# Patient Record
Sex: FEMALE | Race: WHITE | Employment: OTHER | URBAN - METROPOLITAN AREA
[De-identification: names, ages, dates, MRNs, and addresses within clinical notes are randomized per-mention and may not be internally consistent; named-entity substitution may affect disease eponyms.]

---

## 2019-09-23 PROBLEM — I10 ESSENTIAL HYPERTENSION: Status: ACTIVE | Noted: 2019-09-23

## 2019-09-23 PROBLEM — R42 VERTIGO: Status: ACTIVE | Noted: 2019-07-19

## 2019-09-23 PROBLEM — R55 SYNCOPE AND COLLAPSE: Status: ACTIVE | Noted: 2019-07-19

## 2019-09-23 PROBLEM — H90.3 SENSORINEURAL HEARING LOSS, ASYMMETRICAL: Status: ACTIVE | Noted: 2018-11-29

## 2019-09-23 PROBLEM — S29.9XXA RIB INJURY: Status: ACTIVE | Noted: 2019-09-23

## 2019-09-23 PROBLEM — S39.012A LOW BACK STRAIN: Status: ACTIVE | Noted: 2019-09-23

## 2019-09-23 PROBLEM — S60.219A CONTUSION OF WRIST: Status: ACTIVE | Noted: 2019-09-23

## 2019-09-23 PROBLEM — M76.60 ACHILLES TENDINITIS: Status: ACTIVE | Noted: 2019-09-23

## 2021-06-24 PROBLEM — G90.A POTS (POSTURAL ORTHOSTATIC TACHYCARDIA SYNDROME): Status: ACTIVE | Noted: 2020-04-06

## 2021-06-24 PROBLEM — M35.00 SJOGREN'S SYNDROME (HCC): Status: ACTIVE | Noted: 2020-08-05

## 2021-06-24 PROBLEM — E27.1 ADRENAL INSUFFICIENCY (ADDISON'S DISEASE) (HCC): Status: ACTIVE | Noted: 2020-04-06

## 2021-11-11 ENCOUNTER — HOSPITAL ENCOUNTER (OUTPATIENT)
Dept: PHYSICAL THERAPY | Age: 86
Discharge: HOME | End: 2021-11-11
Payer: MEDICARE

## 2021-11-11 PROCEDURE — 97161 PT EVAL LOW COMPLEX 20 MIN: CPT

## 2021-11-11 PROCEDURE — 97110 THERAPEUTIC EXERCISES: CPT

## 2021-11-11 NOTE — PROGRESS NOTES
PHYSICAL THERAPY - DAILY TREATMENT NOTE    Patient Name: Vicente Gr        Date: 2021  : 1935   YES Patient  Verified  Visit #:     Insurance: Payor: Evelia Singh / Plan: [place] MEDICARE PART A & B / Product Type: Medicare /      In time: 245 Out time: 335   Total Treatment Time: 50     BCBS/Medicare Time Tracking (below)   Total Timed Codes (min):  NA 1:1 Treatment Time:  NA     TREATMENT AREA =  Left ankle pain [M25.572]    SUBJECTIVE  Pain Level (on 0 to 10 scale):  5-6  / 10   Medication Changes/New allergies or changes in medical history, any new surgeries or procedures? NO    If yes, update Summary List   Subjective Functional Status/Changes:  []  No changes reported     Patient reports previously having PT elsewhere and did not improve.            Modalities Rationale:    NA    min [] Estim, type/location:                                      []  att     []  unatt     []  w/US     []  w/ice    []  w/heat    min []  Mechanical Traction: type/lbs                   []  pro   []  sup   []  int   []  cont    []  before manual    []  after manual    min []  Ultrasound, settings/location:      min []  Iontophoresis w/ dexamethasone, location:                                               []  take home patch       []  in clinic    min []  Ice     []  Heat    location/position:     min []  Vasopneumatic Device, press/temp:    If using vaso (only need to measure limb vaso being performed on)      pre-treatment girth :       post-treatment girth :       measured at (landmark location) :      min []  Other:    [] Skin assessment post-treatment (if applicable):    []  intact    []  redness- no adverse reaction                  []redness  adverse reaction:        10 min Therapeutic Exercise:  [x]  See flow sheet   Rationale:      increase ROM, increase strength, improve coordination, improve balance and increase proprioception to improve the patients ability to perform pain free ADLs Billed With/As:   [] TE   [] TA   [] Neuro   [] Self Care Patient Education: [x] Review HEP    [] Progressed/Changed HEP based on:   [] positioning   [] body mechanics   [] transfers   [] heat/ice application    [] other:      Other Objective/Functional Measures:    See POC      Post Treatment Pain Level (on 0 to 10) scale:   5  / 10     ASSESSMENT  Assessment/Changes in Function:     See POC     []  See Progress Note/Recertification   Patient will continue to benefit from skilled PT services to modify and progress therapeutic interventions, address functional mobility deficits, address ROM deficits, address strength deficits, analyze and address soft tissue restrictions, analyze and cue movement patterns, analyze and modify body mechanics/ergonomics, assess and modify postural abnormalities, address imbalance/dizziness and instruct in home and community integration to attain remaining goals.    Progress toward goals / Updated goals:    See POC     PLAN  [x]  Upgrade activities as tolerated YES Continue plan of care   []  Discharge due to :    []  Other:      Therapist: Gwendolyn Morse    Date: 11/11/2021 Time: 4:41 PM     Future Appointments   Date Time Provider Yung Wells   11/18/2021  3:30 PM Lorene Serrato, PT [facility]   11/19/2021  3:00 PM Lorene Serrato, PT [facility]   11/23/2021 11:00 AM Lorene Serrato, PT [facility]   11/30/2021  2:00 PM Lorene Serrato, PT [facility]   12/2/2021  2:00 PM Lorene Serrato, PT [facility]   12/7/2021  2:00 PM Lorene Serrato, PT [facility]   12/9/2021  2:00 PM Lorene Serrato, PT [facility]   6/24/2022  1:40 PM [facility], NP [address] [facility]

## 2021-11-11 NOTE — PROGRESS NOTES
[facility] [address] [place] [address]  [address], [address], [address]  Phone: [phone]  Fax: [phone] / [facility]  Patient Name: Jarod Cabrera : 1935   Medical   Diagnosis: L ankle pain  Treatment Diagnosis: Left ankle pain [M25.572]   Onset Date: About 4 months ago     Referral Source: Sabrina Chavez MD [place] [facility]): 2021   Prior Hospitalization: See medical history Provider #: [id number]   Prior Level of Function: painfree ambulation, I with ADLs, recumbent biking 3-4x a week for 30 minutes   Comorbidities: POTS, seconary adrenal insufficiency, hearing impaired, arthritis, fibromyalgia, HTN, scoliosis, DDD   Medications: Verified on Patient Summary List   The Plan of Care and following information is based on the information from the initial evaluation.   ===========================================================================================  Assessment / key information:  Patient is an 80year old female presenting to [facility] with a dx of L ankle pain. Patient reports that she has had increased right and left ankle pain over the last 4-6 months which has progressively gotten worse. She has put herself into a walking boot which has helped with decreasing her overall pain. She is trying to slowly come out of her boot while wearing a supportive sneaker and custom made orthotics. Patient lives in a two story home with bedroom located on the first floor. She typically ambulates with a quadcane. Objective examination is as follows: 1) strength of the L ankle is limited into eversion 3-/5, DF 4-/5, PF 3+/5, posterior tib 4/5.  2) decreased gastroc muscle length bilaterally; lacks 2 degrees from neutral into dorsiflexion with knee extended.  3) decreased toe off and use of ankle rockers during gait 4) notable swelling throughout the L lateral ankle along peroneal mm group. Patient signs and sx are consistent with L ankle pain due to underlying mm weakness patient would benefit from skilled PT services in order to improve strength, range of motion balance, proprioception, coordination in order to improve ease with ADLs and functional mobility.   ===========================================================================================  Eval Complexity: History HIGH Complexity :3+ comorbidities / personal factors will impact the outcome/ POC ;  Examination  LOW Complexity : 1-2 Standardized tests and measures addressing body structure, function, activity limitation and / or participation in recreation ; Presentation LOW Complexity : Stable, uncomplicated ;  Decision Making MEDIUM Complexity : FOTO score of 26-74; Overall Complexity LOW   Problem List: pain affecting function, decrease ROM, decrease strength, edema affecting function, impaired gait/ balance, decrease ADL/ functional abilitiies, decrease activity tolerance, decrease flexibility/ joint mobility and decrease transfer abilities   Treatment Plan may include any combination of the following: Therapeutic exercise, Therapeutic activities, Neuromuscular re-education, Physical agent/modality, Gait/balance training, Manual therapy, Aquatic therapy, Patient education, Self Care training, Functional mobility training, Home safety training and Stair training  Patient / Family readiness to learn indicated by: asking questions, trying to perform skills and interest  Persons(s) to be included in education: patient (P)  Barriers to Learning/Limitations: None  Measures taken, if barriers to learning:    Patient Goal (s): 'able to do regular activities\"   Patient self reported health status: fair  Rehabilitation Potential: good   Short Term Goals: To be accomplished in  3  weeks:  1) Patient will be I and compliant with a basic HEP in order to maintain gains made inphysical therapy.   2) Patient will demonstrate >/- 2 degrees of DF with knee extended to allow for ease with ambulation. 3) Patient to fully wean out of CAM boot and into tennis shoe to improve ease with ADLs.  Long Term Goals: To be accomplished in  6  weeks:  1) Patient will be I and compliant with a progressive, high level HEP in order to maintain gains made in physical therapy. 2) Patient to ascend and descend 5 stairs with 1 HR with a reciprocal gait pattern to allow for ease with accessing her home. 3) Patient to report being able to perform recumbent biking for > 20 minutes to allow for ease with return to previous fitness program.  4) Patient to increase bilateral ankle strength to 4/5 to allow for ease with community ambulation. Frequency / Duration:   Patient to be seen  2  times per week for 4-6  weeks:  Patient / Caregiver education and instruction: self care, activity modification and exercises  Therapist Signature: Helder Zhang PT DPT  Date: 27/30/0724   Certification Period: 11/11/2021-2/11/2021 Time: 4:41 PM   ===========================================================================================  I certify that the above Physical Therapy Services are being furnished while the patient is under my care. I agree with the treatment plan and certify that this therapy is necessary. Physician Signature:                                                             Date:                                     Time:                                                                       Sandra Patton MD  Please sign and return to [facility] or you may fax the signed copy to [phone]. Thank you.

## 2021-11-18 ENCOUNTER — HOSPITAL ENCOUNTER (OUTPATIENT)
Dept: PHYSICAL THERAPY | Age: 86
Discharge: HOME | End: 2021-11-18
Payer: MEDICARE

## 2021-11-18 PROCEDURE — 97110 THERAPEUTIC EXERCISES: CPT

## 2021-11-18 PROCEDURE — 97112 NEUROMUSCULAR REEDUCATION: CPT

## 2021-11-18 NOTE — PROGRESS NOTES
PHYSICAL THERAPY - DAILY TREATMENT NOTE    Patient Name: Lo Delgado        Date: 2021  : 1935   YES Patient  Verified  Visit #:      of   12  Insurance: Payor: Darren Sherwood / Plan: [place] MEDICARE PART A & B / Product Type: Medicare /      In time: 330 Out time: 425   Total Treatment Time: 55     BCBS/Medicare Time Tracking (below)   Total Timed Codes (min):  45 1:1 Treatment Time:  45     TREATMENT AREA =  Left ankle pain [M25.572]    SUBJECTIVE  Pain Level (on 0 to 10 scale):  6  / 10   Medication Changes/New allergies or changes in medical history, any new surgeries or procedures? NO    If yes, update Summary List   Subjective Functional Status/Changes:  []  No changes reported     I have been trying not to wear the boot as much. I have most of my pain in the L foot but the R achilles is sometimes really strong. Modalities Rationale:     decrease edema, decrease inflammation and decrease pain to improve patient's ability to perform pain-free ADLs.     min [] Estim, type/location:                                      []  att     []  unatt     []  w/US     []  w/ice    []  w/heat    min []  Mechanical Traction: type/lbs                   []  pro   []  sup   []  int   []  cont    []  before manual    []  after manual    min []  Ultrasound, settings/location:      min []  Iontophoresis w/ dexamethasone, location:                                               []  take home patch       []  in clinic   10 min [x]  Ice     []  Heat    location/position: BL ankles longsit    min []  Vasopneumatic Device, press/temp:    If using vaso (only need to measure limb vaso being performed on)      pre-treatment girth :       post-treatment girth :       measured at (landmark location) :      min []  Other:    [x] Skin assessment post-treatment (if applicable):    [x]  intact    [x]  redness- no adverse reaction                  []redness  adverse reaction:        30 min Therapeutic Exercise:  [x] See flow sheet   Rationale:      increase ROM, increase strength, improve coordination and improve balance to improve the patients ability to perform unlimited ADLs. 15 min Neuromuscular Re-ed: [x]  See flow sheet   Rationale:    increase ROM, increase strength and improve coordination to improve the patients ability to improve stability with ambulation    Billed With/As:   [] TE   [] TA   [] Neuro   [] Self Care Patient Education: [x] Review HEP    [] Progressed/Changed HEP based on:   [] positioning   [] body mechanics   [] transfers   [] heat/ice application    [] other:      Other Objective/Functional Measures:    Initiate TE per FS     Post Treatment Pain Level (on 0 to 10) scale:   6 / 10     ASSESSMENT  Assessment/Changes in Function:     Difficulty with IN/EV control; manual cues required to prevent rotation of BL hips with Tband 4 way. Discussed slowly weaning out of CAM boot 1 hour/day per week in order to improve stability and muscle activation. []  See Progress Note/Recertification   Patient will continue to benefit from skilled PT services to modify and progress therapeutic interventions, address functional mobility deficits, address ROM deficits, address strength deficits, analyze and address soft tissue restrictions, analyze and cue movement patterns, analyze and modify body mechanics/ergonomics and assess and modify postural abnormalities to attain remaining goals. Progress toward goals / Updated goals:    Progressing towards STG 3.       PLAN  [x]  Upgrade activities as tolerated YES Continue plan of care   []  Discharge due to :    []  Other:      Therapist: Francisca Waters DPT    Date: 11/18/2021 Time: 3:36 PM     Future Appointments   Date Time Provider Yung Wells   11/19/2021  3:00 PM Filomena Suh PT [facility]   11/23/2021 11:00 AM Filomena Suh PT [facility]   11/30/2021  2:00 PM [facility]   12/2/2021  2:00 PM Filomena Suh, PT [facility] 12/7/2021  2:00 PM Heidi Carr, [facility]   12/9/2021  2:00 PM Heidi Carr PT [facility]   6/24/2022  1:40 PM Ann, Deepak Marina, NP [facility] [facility]

## 2021-11-19 ENCOUNTER — APPOINTMENT (OUTPATIENT)
Dept: PHYSICAL THERAPY | Age: 86
End: 2021-11-19
Payer: MEDICARE

## 2021-11-23 ENCOUNTER — HOSPITAL ENCOUNTER (OUTPATIENT)
Dept: PHYSICAL THERAPY | Age: 86
Discharge: HOME | End: 2021-11-23
Payer: MEDICARE

## 2021-11-23 PROCEDURE — 97140 MANUAL THERAPY 1/> REGIONS: CPT

## 2021-11-23 PROCEDURE — 97112 NEUROMUSCULAR REEDUCATION: CPT

## 2021-11-23 PROCEDURE — 97110 THERAPEUTIC EXERCISES: CPT

## 2021-11-23 NOTE — PROGRESS NOTES
PHYSICAL THERAPY - DAILY TREATMENT NOTE    Patient Name: Lo Delgado        Date: 2021  : 1935   YES Patient  Verified  Visit #:   3   of   12  Insurance: Payor: Darren Sherwood / Plan: VA MEDICARE PART A & B / Product Type: Medicare /      In time: 7241 Out time: 1200   Total Treatment Time: 55     BCBS/Medicare Time Tracking (below)   Total Timed Codes (min):  45 1:1 Treatment Time:  45     TREATMENT AREA =  Left ankle pain [M25.572]    SUBJECTIVE  Pain Level (on 0 to 10 scale):  3  / 10   Medication Changes/New allergies or changes in medical history, any new surgeries or procedures? NO    If yes, update Summary List   Subjective Functional Status/Changes:  []  No changes reported     I was in a lot of pain after last time. I rested and iced and it was gone by the next day but I think we did too much          Modalities Rationale:     decrease edema, decrease inflammation and decrease pain to improve patient's ability to perform pain-free ADLs.     min [] Estim, type/location:                                      []  att     []  unatt     []  w/US     []  w/ice    []  w/heat    min []  Mechanical Traction: type/lbs                   []  pro   []  sup   []  int   []  cont    []  before manual    []  after manual    min []  Ultrasound, settings/location:      min []  Iontophoresis w/ dexamethasone, location:                                               []  take home patch       []  in clinic   10 min [x]  Ice     []  Heat    location/position: BL ankles in longsitting    min []  Vasopneumatic Device, press/temp:    If using vaso (only need to measure limb vaso being performed on)      pre-treatment girth :       post-treatment girth :       measured at (landmark location) :      min []  Other:    [x] Skin assessment post-treatment (if applicable):    [x]  intact    [x]  redness- no adverse reaction                  []redness  adverse reaction:        20 min Therapeutic Exercise:  [x]  See flow sheet   Rationale:      increase ROM, increase strength and improve coordination to improve the patients ability to perform unlimited ADLs. 10 min Manual Therapy: STM/MFR peroneals, achilles and post tib with cross friction and TPR, gentle distraction to talocrural joint   Rationale:      decrease pain, increase ROM, increase tissue extensibility and decrease edema  to improve patient's ability to improve standing tolerance. The manual therapy interventions were performed at a separate and distinct time from the therapeutic activities interventions. 15 min Neuromuscular Re-ed: [x]  See flow sheet   Rationale:    increase ROM, increase strength, improve coordination and improve balance to improve the patients ability to improve muscle activation    Billed With/As:   [x] TE   [] TA   [] Neuro   [] Self Care Patient Education: [x] Review HEP    [] Progressed/Changed HEP based on:   [] positioning   [] body mechanics   [] transfers   [] heat/ice application    [] other:      Other Objective/Functional Measures:    Held standing exercises today- added seated HR and manual therapy    Significant restrictions and tenderness along peroneals and soleus musculature   Post Treatment Pain Level (on 0 to 10) scale:   2  / 10     ASSESSMENT  Assessment/Changes in Function:     Improved tolerance and and smooth movements with Tband 4 way, performed all with AROM prior to adding resistance. Encouraged patient to perform AROM of BL ankles prior to standing in the AM to prevent immediate onset of discomfort and pain with first few steps.       []  See Progress Note/Recertification   Patient will continue to benefit from skilled PT services to modify and progress therapeutic interventions, address functional mobility deficits, address ROM deficits, address strength deficits, analyze and address soft tissue restrictions, analyze and cue movement patterns, analyze and modify body mechanics/ergonomics and assess and modify postural abnormalities to attain remaining goals. Progress toward goals / Updated goals:    Progressing towards STG 2.       PLAN  [x]  Upgrade activities as tolerated YES Continue plan of care   []  Discharge due to :    []  Other:      Therapist: Abby Soulier, DPT    Date: 11/23/2021 Time: 1:44 PM     Future Appointments   Date Time Provider Yung Wells   11/30/2021  2:00 PM Margaret Huitron, PT [facility]   12/2/2021  2:00 PM Margaret Huitron, PT [facility]   12/7/2021  2:00 PM Margaret Huitron, PT [facility]   12/9/2021  2:00 PM Margaret Huitron, PT [facility]   6/24/2022  1:40 PM [facility], LIVIER Burris

## 2021-11-30 ENCOUNTER — APPOINTMENT (OUTPATIENT)
Dept: PHYSICAL THERAPY | Age: 86
End: 2021-11-30
Payer: MEDICARE

## 2021-12-02 ENCOUNTER — APPOINTMENT (OUTPATIENT)
Dept: PHYSICAL THERAPY | Age: 86
End: 2021-12-02

## 2021-12-07 ENCOUNTER — APPOINTMENT (OUTPATIENT)
Dept: PHYSICAL THERAPY | Age: 86
End: 2021-12-07

## 2021-12-09 ENCOUNTER — APPOINTMENT (OUTPATIENT)
Dept: PHYSICAL THERAPY | Age: 86
End: 2021-12-09

## 2021-12-29 NOTE — PROGRESS NOTES
[facility] PHYSICAL THERAPY AT [address] [place] [address]  [address], [address], [address]  Phone: [phone]  Fax: [phone] FOR PHYSICAL THERAPY          Patient Name: Drew Cuellar : 1935   Treatment/Medical Diagnosis: Left ankle pain [M25.572]   Onset Date: 4 months prior to eval    Referral Source: Michelle Roblero MD [facility]): 2021   Prior Hospitalization: See Medical History Provider #: [id number]   Prior Level of Function: painfree ambulation, I with ADLs, recumbent biking 3-4x a week for 30 minutes   Comorbidities: POTS, seconary adrenal insufficiency, hearing impaired, arthritis, fibromyalgia, HTN, scoliosis, DDD   Medications: Verified on Patient Summary List   Visits from [facility]: 2 Missed Visits: 3     Key Functional Changes/Progress: Mrs. Mildred Partida was seen for 2 follow up visits for BL ankle pain and was tolerating therex well with reports of moderate muscle soreness following sessions. Pt returned to MD where she was instructed to discontinue therapy as she was making good progress; she has not returned since last session on 2021 and has not verbalized need for further therapy. Assessments/Recommendations: Other: Discharge at physician request.     If you have any questions/comments please contact us directly at [phone]. Thank you for allowing us to assist in the care of your patient. Therapist Signature: Marianne Hernandez DPT Date: 2021   Reporting Period: 2021-2021 Time: 1:79 AM     I certify that the above Physical Therapy Services are being furnished while the patient is under my care. I agree with the treatment plan and certify that this therapy is necessary.     Physician Signature:                                                             Date:                                     Time:                                                                       Michelle Roblero MD

## 2022-03-09 ENCOUNTER — HOSPITAL ENCOUNTER (OUTPATIENT)
Dept: PHYSICAL THERAPY | Age: 87
Discharge: HOME | End: 2022-03-09
Payer: MEDICARE

## 2022-03-09 PROCEDURE — 97535 SELF CARE MNGMENT TRAINING: CPT

## 2022-03-09 PROCEDURE — 97112 NEUROMUSCULAR REEDUCATION: CPT

## 2022-03-09 PROCEDURE — 97162 PT EVAL MOD COMPLEX 30 MIN: CPT

## 2022-03-09 NOTE — PROGRESS NOTES
PHYSICAL THERAPY - DAILY TREATMENT NOTE     Patient Name: Gunner Francis        Date: 3/9/2022  : 1935   YES Patient  Verified  Visit #:      of   16  Insurance: Payor: VA MEDICARE / Plan: VA MEDICARE PART A & B / Product Type: Medicare /      In time: 1485 Out time: 125   Total Treatment Time: 70     Medicare/BCBS Time Tracking (below)   Total Timed Codes (min):  30 1:1 Treatment Time:  70     TREATMENT AREA =  Other abnormalities of gait and mobility [R26.89]    SUBJECTIVE    Pain Level (on 0 to 10 scale):  2  / 10   Medication Changes/New allergies or changes in medical history, any new surgeries or procedures? NO    If yes, update Summary List   Subjective Functional Status/Changes:  []  No changes reported       See POC         OBJECTIVE    10 min Neuromuscular Re-ed: [x]  See flow sheet   Rationale:      increase ROM and improve coordination to improve the patients ability to ambulate safely with head and eye movement and reduce fall risk     20 min Self Care: Instruction in safety w/ bed mobility.   Importance of movement to re establish balance and decrease fall risk, education in importance of reducing fear avoidance activities and vestibular rehab protocols   Rationale:    decrease fear of movement to improve the patients ability to ambulate safely and perform ADL's    Billed With/As:   [] TE   [] TA   [x] Neuro   [x] Self Care Patient Education: [x] Review HEP    [] Progressed/Changed HEP based on:   [] positioning   [] body mechanics   [] transfers   [] heat/ice application    [] other:        Other Objective/Functional Measures:    See POC  See TE Flow sheet     Post Treatment Pain Level (on 0 to 10) scale:   2  / 10     ASSESSMENT    Assessment/Changes in Function:     See POC     []  See Progress Note/Recertification   Patient will continue to benefit from skilled PT services to modify and progress therapeutic interventions, address functional mobility deficits, address ROM deficits, address strength deficits, analyze and address soft tissue restrictions, analyze and cue movement patterns, analyze and modify body mechanics/ergonomics, assess and modify postural abnormalities and address imbalance/dizziness to attain remaining goals.       Progress toward goals / Updated goals:    See POC     PLAN    [x]  Upgrade activities as tolerated YES Continue plan of care   []  Discharge due to :    []  Other:      Therapist: Patrick Vega PT    Date: 3/9/2022 Time: 8:17 AM     Future Appointments   Date Time Provider Yung Wells   3/9/2022 12:15 PM Maria Elena Hardy, PT [facility]   6/24/2022  1:40 PM [facility], NP [address]

## 2022-03-09 NOTE — PROGRESS NOTES
[facility] PHYSICAL THERAPY AT [facility] [address], [address] [facility], [facility] [facility] [facility] [address] - Phone: [phone]  Fax: [phone] / [facility]  Patient Name: Crow Escalante : 1935   Medical   Diagnosis: Other abnormalities of gait and mobility [R26.89] Treatment Diagnosis: Vestibular/balance   Onset Date: Ongoing     Referral Source: Iqra Bhandari* Start of Care [facility]): 3/9/2022   Prior Hospitalization: See medical history Provider #: [id number]   Prior Level of Function: Ambulates w/ quad cane   Comorbidities: POTS, Fibromyalgia, Arthritis, Thyroid, HTN, Hearing impaired, Scoliosis, Vestibular deteriation   Medications: Verified on Patient Summary List     The Plan of Care and following information is based on the information from the initial evaluation.     ==================================================================================  Assessment / key information:   Crow Escalante is a 80 y.o.  yo female with Dx of Other abnormalities of gait and mobility [R26.89]. Patient reports peripheral neuropathy for appx 10 years and worsening vestibular issues over the past 2-2.5 years. Patient ambulates into clinic with small based quad cane; states she does not use it at home, only for community ambulation. Lives in a two story home with her  and reports h/o BPPV. Last vertigo attack was 2 years ago and she reports she is very fearful of another attack so she limits her head movement and rolling . Vertigo  [x] Imbalance   [x]   Dizziness  [x]   Spontaneous [x]    Worse with: Lying down  Sitting up in bed  Turning head Side to side    Standing quickly Bending Forward Looking up    How long did episode last? Sec: Seconds. Past events lasted half a day   Additional symptoms?    Nausea   [x]       Loss of Balance [x]       Headache [x]         Sensory disturbances  Falls [x]  2019     Symptom relief? In past used Eply maneuver. Currently unsure of symptom relief. Does sneezing, coughing, holding your breath or specific sounds exacerbate dizziness? Increased sounds makes her feel 'off balance'  Light sensitivity [x]      Prior treatment: Balance therapy 2 years ago    Hearing Loss L>R  Neck Pain [x]  Back Pain [x] herniated discs/arthritis  Tinnitus L>R  Gradual onset of hearing loss:  Imaging/Testing: Patient has undergone Balance Master Testing, will bring in report    Posture: FHRS. Ambulates with limited to no reciprocal arm swing and high guard position when ambulating without cane; small shuffling step. Sensation/Proprioception: grossly intact  Cervical Screen:                 VAST: NEGATIVE     C/S ROM Limited by 25% in rotation and SB     Cervical Dizziness NEGATIVE     Joint position sense: grossly intact    VOR:    GAZE Nystagmus Direction   Center N    Right Y: mild Right   Left N    Upward N      Head Thrust: WNL  Dynamic Visual Acuity: WNL no Oscillopsia    Key   N = Normal S = Sway  F = Fall  R = Right  L = Left         _N__          __F             __S__           __F__           __S__            __F__          __S__     FGA: 9/30  DHI: 50 Moderate Perception of Handicap  Roll Test: WNL However patient has a heightened fear response to all bed mobility secondary to prior episodes of BPPV  MMT: reveals grossly 4+/5 throughout bilateral LE with the exception of hip rotators 3+/5, hip abd/ext 4-/5    Objectively, the patient demonstrates decreased static/dynamic functional balance, diminished ambulatory balance (FGA = 9/30 points) and dizziness with ADLs ([facility] [address] [place] [address] = 50 points). LUPIS test was abnormal with deficits indicating Abnormal CNS multifactorial issues. .Pt will benefit from PT/Vestibular rehab to address these deficits, improve functional independence and reduce dizziness and imbalance with normal daily activities.  Thank you for this referral. ===========================================================================================  Eval Complexity: History HIGH Complexity :3+ comorbidities / personal factors will impact the outcome/ POC ;  Examination  HIGH Complexity : 4+ Standardized tests and measures addressing body structure, function, activity limitation and / or participation in recreation ; Presentation MEDIUM Complexity : Evolving with changing characteristics ; Decision Making MEDIUM Complexity : FOTO score of 26-74; Overall Complexity MEDIUM  Problem List: impaired gait/ balance, decrease ADL/ functional abilitiies, decrease activity tolerance and decrease transfer abilities   Treatment Plan may include any combination of the following: Therapeutic exercise, Therapeutic activities, Neuromuscular re-education, Gait/balance training and Patient education  Patient / Family readiness to learn indicated by: asking questions, trying to perform skills and interest  Persons(s) to be included in education: patient (P)  Barriers to Learning/Limitations: yes;  sensory deficits-vision/hearing/speech  Measures taken, if barriers to learning: Written instructions and verbal understanding from patient   Patient Goal (s): \"Strengthen legs and better balance on uneven surfaces\"     Rehabilitation Potential: excellent   Short Term Goals: To be accomplished in 4-6  treatments:  1. Patient will report at least 25% reduction of symptoms with ADLs. 2. Patient will be independent and complaint with HEP TID to reduce imbalance and dizziness with ADLs. 3. DHI will improve to less than or equal to 45 points to demonstrate reduction of dizziness and imbalance with ADLs. 4. Patient will demonstrate MSR EO/EC for 30 seconds indicating improved proprioception and balance required for safe ambulation and decreasing fall risk   Long Term Goals: To be accomplished in 10-12 treatments:  1. Patient will report at least 50% reduction of symptoms with ADLs.   2. Patient will be independent with self progression of HEP and demonstrate willingness to continue HEP after D/C to maximize/maintain gains in functional mobility. 3. DHI will improve to less than or equal to 39 points to demonstrate significant reduction of dizziness and imbalance with ADLs. 4. FGA will improve to greater then or equal to 20/30 points to demonstrate a significant improvement in ambulatory balance. Frequency / Duration:   Patient to be seen  2  times per week for 8  weeks:  Patient / Caregiver education and instruction: self care, activity modification and exercises    Therapist Signature: Alexei Jimenes PT Date: 2/1/1490   Certification Period: 3/9/22 to 6/9/22 Time: 8:17 AM     ===========================================================================================  I certify that the above Physical Therapy Services are being furnished while the patient is under my care. I agree with the treatment plan and certify that this therapy is necessary. Physician Signature:        Date:       Time:        Neftaly Infante*  Please sign and return to [facility] or you may fax the signed copy to [phone].   Thank

## 2022-03-15 ENCOUNTER — HOSPITAL ENCOUNTER (OUTPATIENT)
Dept: PHYSICAL THERAPY | Age: 87
Discharge: HOME | End: 2022-03-15
Payer: MEDICARE

## 2022-03-15 PROCEDURE — 97112 NEUROMUSCULAR REEDUCATION: CPT

## 2022-03-15 PROCEDURE — 97110 THERAPEUTIC EXERCISES: CPT

## 2022-03-15 NOTE — PROGRESS NOTES
PHYSICAL THERAPY - DAILY TREATMENT NOTE    Patient Name: Memo Boles        Date: 3/15/2022  : 1935   YES Patient  Verified  Visit #:   2   of   12  Insurance: Payor: Marlyn Counter / Plan: [place] MEDICARE PART A & B / Product Type: Medicare /      In time: 300 Out time: 345   Total Treatment Time: 45     BCBS/Medicare Time Tracking (below)   Total Timed Codes (min):  45 1:1 Treatment Time:  45     TREATMENT AREA =  Other abnormalities of gait and mobility [R26.89]    SUBJECTIVE  Pain Level (on 0 to 10 scale):  0  / 10   Medication Changes/New allergies or changes in medical history, any new surgeries or procedures? NO    If yes, update Summary List   Subjective Functional Status/Changes:  []  No changes reported     Patient reports that she tried to lay on her sides while sleeping but her back really was sore.             Modalities Rationale:   min [] Estim, type/location:                                      []  att     []  unatt     []  w/US     []  w/ice    []  w/heat    min []  Mechanical Traction: type/lbs                   []  pro   []  sup   []  int   []  cont    []  before manual    []  after manual    min []  Ultrasound, settings/location:      min []  Iontophoresis w/ dexamethasone, location:                                               []  take home patch       []  in clinic    min []  Ice     []  Heat    location/position:     min []  Vasopneumatic Device, press/temp:    If using vaso (only need to measure limb vaso being performed on)      pre-treatment girth :       post-treatment girth :       measured at (landmark location) :      min []  Other:    [] Skin assessment post-treatment (if applicable):    []  intact    []  redness- no adverse reaction                  []redness  adverse reaction:        15 min Therapeutic Exercise:  [x]  See flow sheet   Rationale:      increase ROM, increase strength, improve coordination, improve balance and increase proprioception to improve the patients ability to perform unlimited ADLs        30 min Neuromuscular Re-ed: [x]  See flow sheet   Rationale:    increase ROM, increase strength, improve coordination, improve balance and increase proprioception to improve the patients ability to ease with ADLs with decreased risk of falls and increased vestibular compensation    Billed With/As:   [] TE   [] TA   [] Neuro   [] Self Care Patient Education: [x] Review HEP    [] Progressed/Changed HEP based on:   [] positioning   [] body mechanics   [] transfers   [] heat/ice application    [] other:      Other Objective/Functional Measures:    Initiated exercises per flow sheet      Post Treatment Pain Level (on 0 to 10) scale:   0  / 10     ASSESSMENT  Assessment/Changes in Function:     Patient had notable difficulty with all balance exercises today with decreased stability as hold time increased. She tolerated all new vestibular exercises well without increase in overall symptoms. She brought in her VNG testing from 2019 which indicated bilateral vestibular hypofunction L>R. []  See Progress Note/Recertification   Patient will continue to benefit from skilled PT services to modify and progress therapeutic interventions, address functional mobility deficits, address ROM deficits, address strength deficits, analyze and address soft tissue restrictions, analyze and cue movement patterns, analyze and modify body mechanics/ergonomics, assess and modify postural abnormalities, address imbalance/dizziness and instruct in home and community integration to attain remaining goals.    Progress toward goals / Updated goals:    Progressing towards LTG 2      PLAN  [x]  Upgrade activities as tolerated YES Continue plan of care   []  Discharge due to :    []  Other:      Therapist: Jonatan Ceja    Date: 3/15/2022 Time: 4:11 PM     Future Appointments   Date Time Provider Yung Wells   3/21/2022  1:15 PM Angelina Prince, PT [facility]   3/25/2022 12:15 PM Priscila Maldonado, PT [facility]   3/29/2022  3:00 PM Horald Fang [facility]   3/31/2022 10:45 AM Priscila Maldonado, PT [facility]   4/5/2022 12:45 PM Horald Fang [facility]   4/7/2022  1:30 PM Horald Fang [facility]   4/12/2022  1:30 PM Horald Fang [facility]   4/14/2022  1:30 PM Horald Fang [facility]   4/20/2022 12:15 PM Priscila Maldonado PT [facility]   4/21/2022  1:30 PM Horald Fang [facility]   4/26/2022  1:30 PM Horald Fang [facility]   4/28/2022  1:15 PM Stephen Leal PT [facility]   6/24/2022  1:40 PM Ann, [place] [address] [facility]

## 2022-03-21 ENCOUNTER — HOSPITAL ENCOUNTER (OUTPATIENT)
Dept: PHYSICAL THERAPY | Age: 87
Discharge: HOME | End: 2022-03-21
Payer: MEDICARE

## 2022-03-21 PROCEDURE — 97112 NEUROMUSCULAR REEDUCATION: CPT

## 2022-03-21 PROCEDURE — 97110 THERAPEUTIC EXERCISES: CPT

## 2022-03-21 PROCEDURE — 97116 GAIT TRAINING THERAPY: CPT

## 2022-03-21 NOTE — PROGRESS NOTES
PHYSICAL THERAPY - DAILY TREATMENT NOTE    Patient Name: Taylor Angelo        Date: 3/21/2022  : 1935   YES Patient  Verified  Visit #:   3   of   12  Insurance: Payor: Renaldo Black / Plan: [place] MEDICARE PART A & B / Product Type: Medicare /      In time: 1:30 P Out time: 2:10 P   Total Treatment Time: 40     BCBS/Medicare Time Tracking (below)   Total Timed Codes (min):  40 1:1 Treatment Time:  40     TREATMENT AREA =  Other abnormalities of gait and mobility [R26.89]    SUBJECTIVE  Pain Level (on 0 to 10 scale):  0  / 10   Medication Changes/New allergies or changes in medical history, any new surgeries or procedures? NO    If yes, update Summary List   Subjective Functional Status/Changes:  []  No changes reported     Patient reports she has ringing in her L ear today & has little more lightheadedness than usual today, she is doing the eye movement exercises bid.            Modalities Rationale:    Patient deferred   min [] Estim, type/location:                                      []  att     []  unatt     []  w/US     []  w/ice    []  w/heat    min []  Mechanical Traction: type/lbs                   []  pro   []  sup   []  int   []  cont    []  before manual    []  after manual    min []  Ultrasound, settings/location:      min []  Iontophoresis w/ dexamethasone, location:                                               []  take home patch       []  in clinic    min []  Ice     []  Heat    location/position:     min []  Vasopneumatic Device, press/temp:    If using vaso (only need to measure limb vaso being performed on)      pre-treatment girth :       post-treatment girth :       measured at (landmark location) :      min []  Other:    [] Skin assessment post-treatment (if applicable):    []  intact    []  redness- no adverse reaction                  []redness  adverse reaction:        15 min Therapeutic Exercise:  [x]  See flow sheet   Rationale:      increase ROM and increase strength to improve the patients ability to return to safe sit to stand transfers     15 min Neuromuscular Re-ed: [x]  See flow sheet   Rationale:    improve coordination, improve balance and increase proprioception to improve the patients ability to return to symptom free ADLs    10 min Gait Training:  _see flow sheet__ feet with _SBQC on R__ device on level surfaces with _SBA/CGA__ level of assistance   Rationale: To improve ambulation safety and efficiency in order to improve patient's ability to safely ambulate at home for self care. Billed With/As:   [] TE   [] TA   [] Neuro   [] Self Care Patient Education: [x] Review HEP    [] Progressed/Changed HEP based on:   [] positioning   [] body mechanics   [] transfers   [] heat/ice application    [] other:      Other Objective/Functional Measures:    Progressed balance exercises & gait training per flow sheet, added toe taps without UE assist with 6 inch step    Eye movement exercises, progressed depth of exercises in all directions (and reviewed current HEP)     Post Treatment Pain Level (on 0 to 10) scale:   0  / 10     ASSESSMENT  Assessment/Changes in Function:     Good tolerance to all progressions today, no change in symptoms, improved use of ankle/hip balance strategies, difficulty with SLS & weight shifting without support      []  See Progress Note/Recertification   Patient will continue to benefit from skilled PT services to modify and progress therapeutic interventions, address functional mobility deficits, address ROM deficits, address strength deficits, analyze and address soft tissue restrictions, analyze and cue movement patterns, analyze and modify body mechanics/ergonomics, assess and modify postural abnormalities, address imbalance/dizziness and instruct in home and community integration to attain remaining goals.    Progress toward goals / Updated goals:    Progressing towards STG 1      PLAN  []  Upgrade activities as tolerated YES Continue plan of care []  Discharge due to :    []  Other:      Therapist: Too Pedro PT    Date: 3/21/2022 Time: 1:29 PM     Future Appointments   Date Time Provider Yung Wells   3/25/2022 12:15 PM Abhay Garcia, PT [facility]   3/29/2022  3:00 PM Sedrick Crow [facility]   3/31/2022 10:45 AM Abhay Garcia, PT [facility]   4/5/2022 12:45 PM Sedrick Crow [facility]   4/7/2022  1:30 PM Sedrick Crow [facility]   4/12/2022  1:30 PM Sedrick Crow [facility]   4/14/2022  1:30 PM Sedrick Crow [facility]   4/20/2022 12:15 PM Abhay Garcia, PT [facility]   4/21/2022  1:30 PM Sedrick Crow [facility]   4/26/2022  1:30 PM Sedrick Crow [facility]   4/28/2022  1:15 PM Kannan Leong, PT [facility]   6/24/2022  1:40 PM [facility], [place] [facility]

## 2022-03-25 ENCOUNTER — HOSPITAL ENCOUNTER (OUTPATIENT)
Dept: PHYSICAL THERAPY | Age: 87
Discharge: HOME | End: 2022-03-25
Payer: MEDICARE

## 2022-03-25 PROCEDURE — 97112 NEUROMUSCULAR REEDUCATION: CPT

## 2022-03-25 PROCEDURE — 97530 THERAPEUTIC ACTIVITIES: CPT

## 2022-03-25 PROCEDURE — 97110 THERAPEUTIC EXERCISES: CPT

## 2022-03-29 ENCOUNTER — HOSPITAL ENCOUNTER (OUTPATIENT)
Dept: PHYSICAL THERAPY | Age: 87
Discharge: HOME | End: 2022-03-29
Payer: MEDICARE

## 2022-03-29 PROCEDURE — 97116 GAIT TRAINING THERAPY: CPT

## 2022-03-29 PROCEDURE — 97112 NEUROMUSCULAR REEDUCATION: CPT

## 2022-03-29 PROCEDURE — 97110 THERAPEUTIC EXERCISES: CPT

## 2022-03-29 NOTE — PROGRESS NOTES
PHYSICAL THERAPY - DAILY TREATMENT NOTE    Patient Name: Matteo Carlton        Date: 3/29/2022  : 1935   YES Patient  Verified  Visit #:      of   12  Insurance: Payor: Sae Case / Plan: [place] MEDICARE PART A & B / Product Type: Medicare /      In time: 300 Out time: 345   Total Treatment Time: 45     BCBS/Medicare Time Tracking (below)   Total Timed Codes (min):  45 1:1 Treatment Time:  45     TREATMENT AREA =  Other abnormalities of gait and mobility [R26.89]    SUBJECTIVE  Pain Level (on 0 to 10 scale):  0  / 10   Medication Changes/New allergies or changes in medical history, any new surgeries or procedures? NO    If yes, update Summary List   Subjective Functional Status/Changes:  []  No changes reported     Patient reports that shes having a POTS day and is feeling not so great today.            Modalities Rationale:     min [] Estim, type/location:                                      []  att     []  unatt     []  w/US     []  w/ice    []  w/heat    min []  Mechanical Traction: type/lbs                   []  pro   []  sup   []  int   []  cont    []  before manual    []  after manual    min []  Ultrasound, settings/location:      min []  Iontophoresis w/ dexamethasone, location:                                               []  take home patch       []  in clinic    min []  Ice     []  Heat    location/position:     min []  Vasopneumatic Device, press/temp:    If using vaso (only need to measure limb vaso being performed on)      pre-treatment girth :       post-treatment girth :       measured at (landmark location) :      min []  Other:    [] Skin assessment post-treatment (if applicable):    []  intact    []  redness- no adverse reaction                  []redness - adverse reaction:        15 min Therapeutic Exercise:  [x]  See flow sheet   Rationale:      increase ROM, increase strength, improve coordination, improve balance and increase proprioception to improve the patients ability to perform unlimited ADLs      20 min Neuromuscular Re-ed: [x]  See flow sheet   Rationale:    increase ROM, increase strength, improve coordination, improve balance and increase proprioception to improve the patients ability to perform ADLs with decreased risk of falls. 10 min Gait Training:  See flow sheet for gait exercises requiring CGA to min A as needed. Rationale: To improve ambulation safety and efficiency in order to improve patient's ability to safely ambulate at home for self care. Billed With/As:   [] TE   [] TA   [] Neuro   [] Self Care Patient Education: [x] Review HEP    [] Progressed/Changed HEP based on:   [] positioning   [] body mechanics   [] transfers   [] heat/ice application    [] other:      Other Objective/Functional Measures:    Inc reps of balance exercises today      Post Treatment Pain Level (on 0 to 10) scale:   0  / 10     ASSESSMENT  Assessment/Changes in Function:     Patient had difficulty with single limbb exercises today requiring MIN A to maintain upright. She is tolerating all balance progressions well without increase in symptoms. []  See Progress Note/Recertification   Patient will continue to benefit from skilled PT services to modify and progress therapeutic interventions, address functional mobility deficits, address ROM deficits, address strength deficits, analyze and address soft tissue restrictions, analyze and cue movement patterns, analyze and modify body mechanics/ergonomics, assess and modify postural abnormalities, address imbalance/dizziness and instruct in home and community integration to attain remaining goals. Progress toward goals / Updated goals:    Progressing towards LTG 2.       PLAN  [x]  Upgrade activities as tolerated YES Continue plan of care   []  Discharge due to :    []  Other:      Therapist: Ruben Thornton    Date: 3/29/2022 Time: 3:58 PM     Future Appointments   Date Time Provider Yung Wells   4/5/2022 12:45 PM Luh Pinedar [facility]   4/7/2022  1:30 PM Luh Leer [facility]   4/12/2022  1:30 PM Luh Leer [facility]   4/14/2022  1:30 PM Luh Leer [facility]   4/20/2022 12:15 PM Juanpablo Stinson, PT [facility]   4/21/2022  1:30 PM Luh iPnedar [facility]   4/26/2022  1:30 PM Luh Pinedar [facility]   4/28/2022  1:15 PM Jose Alberto Hager, PT [facility]   6/17/2022  3:40 PM [place], [place] [address] [facility]

## 2022-03-31 ENCOUNTER — APPOINTMENT (OUTPATIENT)
Dept: PHYSICAL THERAPY | Age: 87
End: 2022-03-31
Payer: MEDICARE

## 2022-04-05 ENCOUNTER — APPOINTMENT (OUTPATIENT)
Dept: PHYSICAL THERAPY | Age: 87
End: 2022-04-05

## 2022-04-07 ENCOUNTER — APPOINTMENT (OUTPATIENT)
Dept: PHYSICAL THERAPY | Age: 87
End: 2022-04-07

## 2022-04-12 ENCOUNTER — APPOINTMENT (OUTPATIENT)
Dept: PHYSICAL THERAPY | Age: 87
End: 2022-04-12

## 2022-04-14 ENCOUNTER — APPOINTMENT (OUTPATIENT)
Dept: PHYSICAL THERAPY | Age: 87
End: 2022-04-14

## 2022-04-20 ENCOUNTER — APPOINTMENT (OUTPATIENT)
Dept: PHYSICAL THERAPY | Age: 87
End: 2022-04-20

## 2022-04-21 ENCOUNTER — APPOINTMENT (OUTPATIENT)
Dept: PHYSICAL THERAPY | Age: 87
End: 2022-04-21

## 2022-04-26 ENCOUNTER — APPOINTMENT (OUTPATIENT)
Dept: PHYSICAL THERAPY | Age: 87
End: 2022-04-26

## 2022-04-28 ENCOUNTER — APPOINTMENT (OUTPATIENT)
Dept: PHYSICAL THERAPY | Age: 87
End: 2022-04-28

## 2022-05-25 NOTE — PROGRESS NOTES
[address] [place] [address] PHYSICAL THERAPY AT [address] [place] [address] [facility], [address] [place] [address], [address] [facility] [address]  Phone: [phone]  Fax: [phone] FOR PHYSICAL THERAPY          Patient Name: Ronald Sandoval : 1935   Treatment/Medical Diagnosis: Other abnormalities of gait and mobility [R26.89]   Onset Date: ongion    Referral Source: Sharonda Boykin* Start of Care [facility]): 3/9/2022   Prior Hospitalization: See Medical History Provider #: [id number]   Prior Level of Function: Ambulation with quad cane   Comorbidities: POTS, fibromyalgia, arthritis, thyroid, HTN, hearing impairment, scoliosis, vestibular deterioration    Medications: Verified on Patient Summary List   Visits from [facility]: 5 Missed Visits: -       Key Functional Changes/Progress: Ms. Gia Mckeon was last seen on 3/29/2022 and at this point in time was having increased POTS symptoms but was tolerating all vestibular, strengthening, gait and balance exercises well with minimal increase in symptoms. Upon f/u phone call with clinic she cancelled remaining visits due to having \"heart issues\"  Her current status is unknown and formal assessment towards goals is unable to be made at this point in time. Assessments/Recommendations: Discontinue therapy due to lack of attendance or compliance. If you have any questions/comments please contact us directly at [phone]. Thank you for allowing us to assist in the care of your patient.     Therapist Signature: Sade Elias PT DPT  Date: 2022   Reporting Period: 3/9/2022- 3/29/2022 Time: 8:38 AM

## 2022-09-09 NOTE — PROGRESS NOTES
PHYSICAL THERAPY - DAILY TREATMENT NOTE     Patient Name: Benny Richards        Date: 3/25/2022  : 1935   YES Patient  Verified  Visit #:      12  Insurance: Payor: [employer] / Plan: [place] A & B / Product Type: Medicare /      In time:  Out time: 105   Total Treatment Time: 50     Medicare/BCBS Time Tracking (below)   Total Timed Codes (min):  50 1:1 Treatment Time:  50     TREATMENT AREA =  Other abnormalities of gait and mobility [R26.89]    SUBJECTIVE    Pain Level (on 0 to 10 scale):  0  / 10   Medication Changes/New allergies or changes in medical history, any new surgeries or procedures?     NO    If yes, update Summary List   Subjective Functional Status/Changes:  []  No changes reported       Functional improvements: \"I feel a bit steadier\"  Functional impairments: Decreased balance affecting ADL's and increasing fall risk         OBJECTIVE  15 min Therapeutic Exercise:  [x]  See flow sheet   Rationale:      increase ROM, increase strength and improve coordination to improve the patients ability to ambulate safely without deviation     10 min Therapeutic Activity: -  See flow sheet   Rationale:      increase ROM, increase strength, improve coordination and improve balance to improve the patients ability to perform ADL's w/o LOB and increased stability with functional movement     25 min Neuromuscular Re-ed: [x]  See flow sheet   Rationale:      increase strength, improve coordination, improve balance and increase proprioception to improve the patients ability to perform ADL's and ambulate on even and uneven surfaces decreasing fall risk     Billed With/As:   [x] TE   [x] TA   [x] Neuro   [] Self Care Patient Education: [x] Review HEP    [x] Progressed/Changed HEP based on:   [] positioning   [] body mechanics   [] transfers   [] heat/ice application    [x] other: improving VOR and balance       Other Objective/Functional Measures:    Changed VSE to standing with feet normal JESUS, blank wall at [address] [facility] H/V  Added MSR EO/EC to HEP  Progressed Balance and therex activities per flow sheet    Utilized cognitive cards with MSR to challenge VOR, head movement and balance     Post Treatment Pain Level (on 0 to 10) scale:   0  / 10     ASSESSMENT    Assessment/Changes in Function:     Patient tolerated increased activities, cognitive challenges and increased speed with VSE well without increased symptoms. She tends to be more reluctant to perform activities w/ Vertical head movement compared to horizontal but is able to complete with verbal cueing. Requires verbal cues to increase hip extension with retro ambulation. Overall improvement noted in ambulation, balance, and overall confidence with mobility. []  See Progress Note/Recertification   Patient will continue to benefit from skilled PT services to modify and progress therapeutic interventions, address functional mobility deficits, address ROM deficits, address strength deficits, analyze and address soft tissue restrictions, analyze and cue movement patterns, analyze and modify body mechanics/ergonomics, assess and modify postural abnormalities and address imbalance/dizziness to attain remaining goals. Progress toward goals / Updated goals:    1. Patient will report at least 25% reduction of symptoms with ADLs. Good Progress  2. Patient will be independent and complaint with HEP TID to reduce imbalance and dizziness with ADLs. Good Progress  3. DHI will improve to less than or equal to 45 points to demonstrate reduction of dizziness and imbalance with ADLs. Good Progress  4. Patient will demonstrate MSR EO/EC for 30 seconds indicating improved proprioception and balance required for safe ambulation and decreasing fall risk.  Good Progress     PLAN    [x]  Upgrade activities as tolerated YES Continue plan of care   []  Discharge due to :    []  Other:      Therapist: Gail Good PT    Date: 3/25/2022 Time: 8:29 AM Future Appointments   Date Time Provider Yung Wells   3/25/2022 12:15 PM Heidi Crow, PT [facility]   3/29/2022  3:00 PM Kevin Finely [facility]   3/31/2022 10:45 AM Heidi Crow, PT [facility]   4/5/2022 12:45 PM Kevin Finely [facility]   4/7/2022  1:30 PM Kevin Finely [facility]   4/12/2022  1:30 PM Kevin Finely [facility]   4/14/2022  1:30 PM Kevin Finely [facility]   4/20/2022 12:15 PM Heidi Crow, PT [facility]   4/21/2022  1:30 PM Kevin Finely [facility]   4/26/2022  1:30 PM Kevin Finely [facility]   4/28/2022  1:15 PM Michael Norwood PT [facility]   6/17/2022  3:40 PM Ann, [place] [id number] [facility] [place]